# Patient Record
(demographics unavailable — no encounter records)

---

## 2024-10-16 NOTE — PHYSICAL EXAM
[de-identified] : CERUMEN REMOVED/ HEARING IMPROVED [Normal] : mucosa is normal [Midline] : trachea located in midline position

## 2024-10-16 NOTE — REVIEW OF SYSTEMS
[Sneezing] : sneezing [Seasonal Allergies] : seasonal allergies [Nasal Congestion] : nasal congestion [Sinus Pressure] : sinus pressure [Heartburn] : heartburn [Negative] : Heme/Lymph [Patient Intake Form Reviewed] : Patient intake form was reviewed

## 2024-10-16 NOTE — HISTORY OF PRESENT ILLNESS
[de-identified] : RIGHT NOSTRIL DISCOMFORT FOLLOWING MOSQUITO EXPOSURE LAST NIGHT EARS CLOGGED MEDICAL HX REVIEWED

## 2024-10-16 NOTE — REASON FOR VISIT
[Initial Consultation] : an initial consultation for [FreeTextEntry2] : Feel like bug is Lt nostril